# Patient Record
Sex: MALE | URBAN - METROPOLITAN AREA
[De-identification: names, ages, dates, MRNs, and addresses within clinical notes are randomized per-mention and may not be internally consistent; named-entity substitution may affect disease eponyms.]

---

## 2022-07-22 ENCOUNTER — APPOINTMENT (OUTPATIENT)
Dept: URBAN - METROPOLITAN AREA CLINIC 256 | Age: 22
Setting detail: DERMATOLOGY
End: 2022-07-22

## 2022-07-22 VITALS — WEIGHT: 190 LBS | HEIGHT: 68 IN

## 2022-07-22 DIAGNOSIS — L20.89 OTHER ATOPIC DERMATITIS: ICD-10-CM

## 2022-07-22 PROBLEM — L20.84 INTRINSIC (ALLERGIC) ECZEMA: Status: ACTIVE | Noted: 2022-07-22

## 2022-07-22 PROCEDURE — 99203 OFFICE O/P NEW LOW 30 MIN: CPT

## 2022-07-22 PROCEDURE — OTHER PRESCRIPTION: OTHER

## 2022-07-22 PROCEDURE — OTHER COUNSELING: PREDNISONE: OTHER

## 2022-07-22 PROCEDURE — OTHER COUNSELING: OTHER

## 2022-07-22 PROCEDURE — OTHER OTC TREATMENT REGIMEN: OTHER

## 2022-07-22 PROCEDURE — OTHER PRESCRIPTION MEDICATION MANAGEMENT: OTHER

## 2022-07-22 PROCEDURE — OTHER MIPS QUALITY: OTHER

## 2022-07-22 RX ORDER — PREDNISONE 20 MG/1
TABLET ORAL
Qty: 13 | Refills: 0 | Status: ERX | COMMUNITY
Start: 2022-07-22

## 2022-07-22 RX ORDER — FLUOCINONIDE 0.5 MG/G
CREAM TOPICAL
Qty: 120 | Refills: 1 | Status: ERX | COMMUNITY
Start: 2022-07-22

## 2022-07-22 ASSESSMENT — LOCATION SIMPLE DESCRIPTION DERM
LOCATION SIMPLE: RIGHT SHOULDER
LOCATION SIMPLE: CHEST

## 2022-07-22 ASSESSMENT — LOCATION DETAILED DESCRIPTION DERM
LOCATION DETAILED: RIGHT POSTERIOR SHOULDER
LOCATION DETAILED: LEFT MEDIAL INFERIOR CHEST

## 2022-07-22 ASSESSMENT — LOCATION ZONE DERM
LOCATION ZONE: TRUNK
LOCATION ZONE: ARM

## 2022-07-22 NOTE — PROCEDURE: MIPS QUALITY
Quality 130: Documentation Of Current Medications In The Medical Record: Current Medications Documented
Quality 226: Preventive Care And Screening: Tobacco Use: Screening And Cessation Intervention: Patient screened for tobacco use, is a smoker AND received Cessation Counseling within the Previous 12 Months
Quality 431: Preventive Care And Screening: Unhealthy Alcohol Use - Screening: Patient not identified as an unhealthy alcohol user when screened for unhealthy alcohol use using a systematic screening method
Detail Level: Detailed

## 2022-07-22 NOTE — HPI: RASH
Is This A New Presentation, Or A Follow-Up?: Rash
Additional History: Patient reports rash beginning in January. He was given oral steroids at the time and it cleared up. Patient reports rash reappearing in the beginning of the summer and flaring yesterday after playing golf. Patient believes it acts up when he is hot and sweaty.

## 2022-07-22 NOTE — PROCEDURE: OTC TREATMENT REGIMEN
Detail Level: Zone
Patient Specific Otc Recommendations (Will Not Stick From Patient To Patient): Gentle, bland cleanser recommended\\nFree & Clear detergent recommended

## 2022-07-22 NOTE — PROCEDURE: PRESCRIPTION MEDICATION MANAGEMENT
Render In Strict Bullet Format?: No
Plan: Severe, acute flare\\n\\nPrednisone taper x 1 week in conjunction with topical steroid ddue to important work event this week & moderate discomfort. \\nReviewed RBA of topicals and oral prednisone
Detail Level: Zone